# Patient Record
Sex: FEMALE | URBAN - NONMETROPOLITAN AREA
[De-identification: names, ages, dates, MRNs, and addresses within clinical notes are randomized per-mention and may not be internally consistent; named-entity substitution may affect disease eponyms.]

---

## 2018-09-06 ENCOUNTER — OPTICAL OFFICE (OUTPATIENT)
Dept: URBAN - NONMETROPOLITAN AREA CLINIC 4 | Facility: CLINIC | Age: 48
Setting detail: OPHTHALMOLOGY
End: 2018-09-06
Payer: COMMERCIAL

## 2018-09-06 ENCOUNTER — DOCTOR'S OFFICE (OUTPATIENT)
Dept: URBAN - NONMETROPOLITAN AREA CLINIC 1 | Facility: CLINIC | Age: 48
Setting detail: OPHTHALMOLOGY
End: 2018-09-06
Payer: COMMERCIAL

## 2018-09-06 DIAGNOSIS — H52.4: ICD-10-CM

## 2018-09-06 DIAGNOSIS — H52.13: ICD-10-CM

## 2018-09-06 DIAGNOSIS — H40.003: ICD-10-CM

## 2018-09-06 PROCEDURE — 92014 COMPRE OPH EXAM EST PT 1/>: CPT | Performed by: OPTOMETRIST

## 2018-09-06 PROCEDURE — V2020 VISION SVCS FRAMES PURCHASES: HCPCS | Performed by: OPHTHALMOLOGY

## 2018-09-06 PROCEDURE — V2784 LENS POLYCARB OR EQUAL: HCPCS | Performed by: OPHTHALMOLOGY

## 2018-09-06 PROCEDURE — 92015 DETERMINE REFRACTIVE STATE: CPT | Performed by: OPTOMETRIST

## 2018-09-06 PROCEDURE — V2200 LENS SPHER BIFOC PLANO 4.00D: HCPCS | Performed by: OPHTHALMOLOGY

## 2018-09-06 ASSESSMENT — REFRACTION_MANIFEST
OD_ADD: +1.75
OS_CYLINDER: SPH
OS_ADD: +1.75
OS_VA2: 20/
OS_ADD: +1.50
OS_VA2: 20/20-1
OD_VA3: 20/
OD_VA2: 20/25+2
OD_ADD: +1.50
OD_VA1: 20/20
OD_VA3: 20/
OS_SPHERE: -3.25
OD_VA2: 20/
OS_VA3: 20/
OS_VA1: 20/20
OD_VA1: 20/25+2
OD_CYLINDER: SPH
OS_VA1: 20/20-2
OS_VA3: 20/
OS_SPHERE: -3.25
OD_SPHERE: -3.25
OD_SPHERE: -3.25
OU_VA: 20/
OU_VA: 20/

## 2018-09-06 ASSESSMENT — VISUAL ACUITY
OS_BCVA: 20/150
OD_BCVA: 20/70-1

## 2018-09-06 ASSESSMENT — REFRACTION_CURRENTRX
OS_OVR_VA: 20/
OD_OVR_VA: 20/
OS_AXIS: 180
OS_VPRISM_DIRECTION: SV
OD_OVR_VA: 20/
OD_SPHERE: -3.75
OD_AXIS: 180
OD_OVR_VA: 20/
OS_CYLINDER: 0.00
OS_OVR_VA: 20/
OS_OVR_VA: 20/
OD_VPRISM_DIRECTION: SV
OD_CYLINDER: 0.00
OS_SPHERE: -3.25

## 2018-09-06 ASSESSMENT — REFRACTION_AUTOREFRACTION
OS_CYLINDER: -0.50
OD_SPHERE: -2.75
OD_CYLINDER: -0.50
OS_AXIS: 149
OD_AXIS: 021
OS_SPHERE: -2.25

## 2018-09-06 ASSESSMENT — SPHEQUIV_DERIVED
OS_SPHEQUIV: -2.5
OD_SPHEQUIV: -3

## 2018-09-06 ASSESSMENT — CONFRONTATIONAL VISUAL FIELD TEST (CVF)
OS_FINDINGS: FULL
OD_FINDINGS: FULL

## 2019-07-31 ENCOUNTER — OPTICAL OFFICE (OUTPATIENT)
Dept: URBAN - NONMETROPOLITAN AREA CLINIC 4 | Facility: CLINIC | Age: 49
Setting detail: OPHTHALMOLOGY
End: 2019-07-31
Payer: COMMERCIAL

## 2019-07-31 DIAGNOSIS — H52.13: ICD-10-CM

## 2019-07-31 PROCEDURE — V2020 VISION SVCS FRAMES PURCHASES: HCPCS | Performed by: OPTOMETRIST

## 2019-07-31 PROCEDURE — V2784 LENS POLYCARB OR EQUAL: HCPCS | Performed by: OPTOMETRIST

## 2019-07-31 PROCEDURE — V2200 LENS SPHER BIFOC PLANO 4.00D: HCPCS | Performed by: OPTOMETRIST

## 2019-07-31 PROCEDURE — V2744 TINT PHOTOCHROMATIC LENS/ES: HCPCS | Performed by: OPTOMETRIST

## 2019-09-13 ENCOUNTER — DOCTOR'S OFFICE (OUTPATIENT)
Dept: URBAN - NONMETROPOLITAN AREA CLINIC 1 | Facility: CLINIC | Age: 49
Setting detail: OPHTHALMOLOGY
End: 2019-09-13
Payer: COMMERCIAL

## 2019-09-13 ENCOUNTER — OPTICAL OFFICE (OUTPATIENT)
Dept: URBAN - NONMETROPOLITAN AREA CLINIC 4 | Facility: CLINIC | Age: 49
Setting detail: OPHTHALMOLOGY
End: 2019-09-13
Payer: COMMERCIAL

## 2019-09-13 DIAGNOSIS — H40.003: ICD-10-CM

## 2019-09-13 DIAGNOSIS — H52.13: ICD-10-CM

## 2019-09-13 PROCEDURE — V2750 ANTI-REFLECTIVE COATING: HCPCS | Performed by: OPTOMETRIST

## 2019-09-13 PROCEDURE — V2203 LENS SPHCYL BIFOCAL 4.00D/.1: HCPCS | Performed by: OPTOMETRIST

## 2019-09-13 PROCEDURE — V2202 LENS SPHERE BIFOCAL 7.12-20.: HCPCS | Performed by: OPTOMETRIST

## 2019-09-13 PROCEDURE — V2784 LENS POLYCARB OR EQUAL: HCPCS | Performed by: OPTOMETRIST

## 2019-09-13 PROCEDURE — 92014 COMPRE OPH EXAM EST PT 1/>: CPT | Performed by: OPTOMETRIST

## 2019-09-13 PROCEDURE — V2020 VISION SVCS FRAMES PURCHASES: HCPCS | Performed by: OPTOMETRIST

## 2019-09-13 ASSESSMENT — VISUAL ACUITY
OD_BCVA: 20/25
OS_BCVA: 20/20-2

## 2019-09-13 ASSESSMENT — REFRACTION_MANIFEST
OS_CYLINDER: SPH
OS_VA2: 20/20-1
OS_ADD: +1.50
OD_VA2: 20/
OD_VA1: 20/20
OD_SPHERE: -3.25
OS_SPHERE: -3.25
OD_ADD: +1.75
OD_VA1: 20/25+2
OD_SPHERE: -3.25
OS_ADD: +1.75
OS_VA3: 20/
OD_CYLINDER: SPH
OD_SPHERE: -3.25
OS_AXIS: 90
OD_ADD: +1.50
OD_VA3: 20/
OD_VA3: 20/
OU_VA: 20/
OD_VA2: 20/
OS_VA1: 20/20
OD_ADD: +1.75
OS_VA2: 20/
OS_CYLINDER: -0.25
OS_VA1: 20/20-2
OS_VA3: 20/
OS_ADD: +1.75
OD_VA1: 20/20
OU_VA: 20/
OS_SPHERE: -3.25
OS_VA1: 20/20
OD_VA2: 20/25+2
OD_VA3: 20/
OS_SPHERE: -3.25
OS_VA3: 20/
OS_VA2: 20/
OD_CYLINDER: SPH

## 2019-09-13 ASSESSMENT — REFRACTION_CURRENTRX
OD_ADD: +2.00
OD_OVR_VA: 20/
OS_OVR_VA: 20/
OD_OVR_VA: 20/
OD_OVR_VA: 20/
OS_AXIS: 174
OD_AXIS: 52
OS_OVR_VA: 20/
OS_ADD: +2.00
OS_VPRISM_DIRECTION: BF
OS_OVR_VA: 20/
OS_CYLINDER: -0.25
OD_VPRISM_DIRECTION: BF
OS_SPHERE: -3.25
OD_CYLINDER: -0.25
OD_SPHERE: -3.25

## 2019-09-13 ASSESSMENT — SPHEQUIV_DERIVED
OD_SPHEQUIV: -3
OS_SPHEQUIV: -3.375
OS_SPHEQUIV: -2.5

## 2019-09-13 ASSESSMENT — REFRACTION_AUTOREFRACTION
OS_CYLINDER: -0.50
OS_SPHERE: -2.25
OD_CYLINDER: -0.50
OD_AXIS: 021
OS_AXIS: 149
OD_SPHERE: -2.75

## 2019-09-13 ASSESSMENT — CONFRONTATIONAL VISUAL FIELD TEST (CVF)
OD_FINDINGS: FULL
OS_FINDINGS: FULL

## 2020-09-15 ENCOUNTER — OPTICAL OFFICE (OUTPATIENT)
Dept: URBAN - NONMETROPOLITAN AREA CLINIC 4 | Facility: CLINIC | Age: 50
Setting detail: OPHTHALMOLOGY
End: 2020-09-15
Payer: COMMERCIAL

## 2020-09-15 ENCOUNTER — DOCTOR'S OFFICE (OUTPATIENT)
Dept: URBAN - NONMETROPOLITAN AREA CLINIC 1 | Facility: CLINIC | Age: 50
Setting detail: OPHTHALMOLOGY
End: 2020-09-15
Payer: COMMERCIAL

## 2020-09-15 DIAGNOSIS — H52.4: ICD-10-CM

## 2020-09-15 DIAGNOSIS — H52.13: ICD-10-CM

## 2020-09-15 DIAGNOSIS — H40.003: ICD-10-CM

## 2020-09-15 PROCEDURE — V2020 VISION SVCS FRAMES PURCHASES: HCPCS | Performed by: OPTOMETRIST

## 2020-09-15 PROCEDURE — 92015 DETERMINE REFRACTIVE STATE: CPT | Performed by: OPTOMETRIST

## 2020-09-15 PROCEDURE — 92014 COMPRE OPH EXAM EST PT 1/>: CPT | Performed by: OPTOMETRIST

## 2020-09-15 PROCEDURE — V2203 LENS SPHCYL BIFOCAL 4.00D/.1: HCPCS | Performed by: OPTOMETRIST

## 2020-09-15 PROCEDURE — V2744 TINT PHOTOCHROMATIC LENS/ES: HCPCS | Performed by: OPTOMETRIST

## 2020-09-15 PROCEDURE — V2202 LENS SPHERE BIFOCAL 7.12-20.: HCPCS | Performed by: OPTOMETRIST

## 2020-09-15 PROCEDURE — 92083 EXTENDED VISUAL FIELD XM: CPT | Performed by: OPTOMETRIST

## 2020-09-15 PROCEDURE — V2784 LENS POLYCARB OR EQUAL: HCPCS | Performed by: OPTOMETRIST

## 2020-09-15 ASSESSMENT — REFRACTION_CURRENTRX
OS_ADD: +2.00
OS_VPRISM_DIRECTION: BF
OD_VPRISM_DIRECTION: BF
OD_AXIS: 180
OS_SPHERE: -3.50
OD_OVR_VA: 20/
OD_ADD: +2.00
OS_CYLINDER: 0.00
OS_OVR_VA: 20/
OS_AXIS: 174
OD_CYLINDER: 0.00
OD_SPHERE: -3.25

## 2020-09-15 ASSESSMENT — REFRACTION_AUTOREFRACTION
OS_AXIS: 172
OS_CYLINDER: -0.25
OD_CYLINDER: -0.50
OS_SPHERE: -3.00
OD_AXIS: 006

## 2020-09-15 ASSESSMENT — REFRACTION_MANIFEST
OS_SPHERE: -3.25
OS_ADD: +2.25
OD_SPHERE: -3.25
OD_VA2: 20/25+2
OS_AXIS: 90
OS_VA1: 20/20
OD_VA1: 20/25+2
OS_AXIS: 175
OD_VA1: 20/20
OS_SPHERE: -3.25
OD_SPHERE: -3.00
OD_SPHERE: -3.25
OS_VA2: 20/20-1
OD_ADD: +2.25
OD_CYLINDER: SPH
OS_ADD: +1.75
OD_ADD: +1.75
OS_CYLINDER: -0.25
OD_VA1: 20/20
OS_VA1: 20/20
OD_ADD: +1.75
OD_CYLINDER: SPH
OS_ADD: +1.75
OS_SPHERE: -3.25
OS_CYLINDER: -0.25
OS_VA1: 20/20-2

## 2020-09-15 ASSESSMENT — VISUAL ACUITY
OD_BCVA: 20/25
OS_BCVA: 20/20-1

## 2020-09-15 ASSESSMENT — CONFRONTATIONAL VISUAL FIELD TEST (CVF)
OS_FINDINGS: FULL
OD_FINDINGS: FULL

## 2020-09-15 ASSESSMENT — SPHEQUIV_DERIVED
OS_SPHEQUIV: -3.125
OS_SPHEQUIV: -3.375
OS_SPHEQUIV: -3.375

## 2020-10-02 ENCOUNTER — OPTICAL OFFICE (OUTPATIENT)
Dept: URBAN - NONMETROPOLITAN AREA CLINIC 4 | Facility: CLINIC | Age: 50
Setting detail: OPHTHALMOLOGY
End: 2020-10-02
Payer: COMMERCIAL

## 2020-10-02 DIAGNOSIS — H52.13: ICD-10-CM

## 2020-10-02 PROCEDURE — V2784 LENS POLYCARB OR EQUAL: HCPCS | Performed by: OPTOMETRIST

## 2020-10-02 PROCEDURE — V2203 LENS SPHCYL BIFOCAL 4.00D/.1: HCPCS | Performed by: OPTOMETRIST

## 2020-10-02 PROCEDURE — V2202 LENS SPHERE BIFOCAL 7.12-20.: HCPCS | Performed by: OPTOMETRIST

## 2020-10-02 PROCEDURE — V2020 VISION SVCS FRAMES PURCHASES: HCPCS | Performed by: OPTOMETRIST

## 2021-01-15 ENCOUNTER — IMMUNIZATIONS (OUTPATIENT)
Dept: FAMILY MEDICINE CLINIC | Facility: HOSPITAL | Age: 51
End: 2021-01-15

## 2021-01-15 DIAGNOSIS — Z23 ENCOUNTER FOR IMMUNIZATION: Primary | ICD-10-CM

## 2021-01-15 PROCEDURE — 91301 SARS-COV-2 / COVID-19 MRNA VACCINE (MODERNA) 100 MCG: CPT

## 2021-01-15 PROCEDURE — 0011A SARS-COV-2 / COVID-19 MRNA VACCINE (MODERNA) 100 MCG: CPT

## 2021-02-12 ENCOUNTER — IMMUNIZATIONS (OUTPATIENT)
Dept: FAMILY MEDICINE CLINIC | Facility: HOSPITAL | Age: 51
End: 2021-02-12

## 2021-02-12 DIAGNOSIS — Z23 ENCOUNTER FOR IMMUNIZATION: Primary | ICD-10-CM

## 2021-02-12 PROCEDURE — 91301 SARS-COV-2 / COVID-19 MRNA VACCINE (MODERNA) 100 MCG: CPT | Performed by: EMERGENCY MEDICINE

## 2021-02-12 PROCEDURE — 0012A SARS-COV-2 / COVID-19 MRNA VACCINE (MODERNA) 100 MCG: CPT | Performed by: EMERGENCY MEDICINE

## 2021-04-18 ENCOUNTER — APPOINTMENT (EMERGENCY)
Dept: CT IMAGING | Facility: HOSPITAL | Age: 51
End: 2021-04-18
Payer: COMMERCIAL

## 2021-04-18 ENCOUNTER — HOSPITAL ENCOUNTER (EMERGENCY)
Facility: HOSPITAL | Age: 51
Discharge: HOME | End: 2021-04-18
Attending: EMERGENCY MEDICINE
Payer: COMMERCIAL

## 2021-04-18 ENCOUNTER — APPOINTMENT (EMERGENCY)
Dept: RADIOLOGY | Facility: HOSPITAL | Age: 51
End: 2021-04-18
Payer: COMMERCIAL

## 2021-04-18 VITALS
HEART RATE: 68 BPM | SYSTOLIC BLOOD PRESSURE: 111 MMHG | WEIGHT: 274.03 LBS | DIASTOLIC BLOOD PRESSURE: 67 MMHG | RESPIRATION RATE: 27 BRPM | TEMPERATURE: 99.3 F | OXYGEN SATURATION: 98 %

## 2021-04-18 DIAGNOSIS — J18.9 PNEUMONIA: ICD-10-CM

## 2021-04-18 DIAGNOSIS — F41.9 ANXIETY: Primary | ICD-10-CM

## 2021-04-18 LAB
ALBUMIN SERPL BCP-MCNC: 3.4 G/DL (ref 3.5–5)
ALP SERPL-CCNC: 96 U/L (ref 46–116)
ALT SERPL W P-5'-P-CCNC: 18 U/L (ref 12–78)
ANION GAP SERPL CALCULATED.3IONS-SCNC: 5 MMOL/L (ref 4–13)
AST SERPL W P-5'-P-CCNC: 16 U/L (ref 5–45)
BACTERIA UR QL AUTO: ABNORMAL /HPF
BASOPHILS # BLD MANUAL: 0 THOUSAND/UL (ref 0–0.1)
BASOPHILS NFR MAR MANUAL: 0 % (ref 0–1)
BILIRUB SERPL-MCNC: 0.56 MG/DL (ref 0.2–1)
BILIRUB UR QL STRIP: NEGATIVE
BUN SERPL-MCNC: 20 MG/DL (ref 5–25)
CALCIUM ALBUM COR SERPL-MCNC: 9.2 MG/DL (ref 8.3–10.1)
CALCIUM SERPL-MCNC: 8.7 MG/DL (ref 8.3–10.1)
CHLORIDE SERPL-SCNC: 102 MMOL/L (ref 100–108)
CLARITY UR: CLEAR
CO2 SERPL-SCNC: 29 MMOL/L (ref 21–32)
COLOR UR: YELLOW
CREAT SERPL-MCNC: 0.99 MG/DL (ref 0.6–1.3)
EOSINOPHIL # BLD MANUAL: 0.2 THOUSAND/UL (ref 0–0.4)
EOSINOPHIL NFR BLD MANUAL: 1 % (ref 0–6)
ERYTHROCYTE [DISTWIDTH] IN BLOOD BY AUTOMATED COUNT: 13.6 % (ref 11.6–15.1)
FLUAV RNA RESP QL NAA+PROBE: NEGATIVE
FLUBV RNA RESP QL NAA+PROBE: NEGATIVE
GFR SERPL CREATININE-BSD FRML MDRD: 67 ML/MIN/1.73SQ M
GLUCOSE SERPL-MCNC: 105 MG/DL (ref 65–140)
GLUCOSE UR STRIP-MCNC: NEGATIVE MG/DL
HCT VFR BLD AUTO: 44.4 % (ref 34.8–46.1)
HGB BLD-MCNC: 14.3 G/DL (ref 11.5–15.4)
HGB UR QL STRIP.AUTO: ABNORMAL
KETONES UR STRIP-MCNC: NEGATIVE MG/DL
LACTATE SERPL-SCNC: 0.8 MMOL/L (ref 0.5–2)
LEUKOCYTE ESTERASE UR QL STRIP: NEGATIVE
LYMPHOCYTES # BLD AUTO: 0.59 THOUSAND/UL (ref 0.6–4.47)
LYMPHOCYTES # BLD AUTO: 3 % (ref 14–44)
MCH RBC QN AUTO: 28.5 PG (ref 26.8–34.3)
MCHC RBC AUTO-ENTMCNC: 32.2 G/DL (ref 31.4–37.4)
MCV RBC AUTO: 88 FL (ref 82–98)
MONOCYTES # BLD AUTO: 1.18 THOUSAND/UL (ref 0–1.22)
MONOCYTES NFR BLD: 6 % (ref 4–12)
MUCOUS THREADS UR QL AUTO: ABNORMAL
NEUTROPHILS # BLD MANUAL: 17.69 THOUSAND/UL (ref 1.85–7.62)
NEUTS BAND NFR BLD MANUAL: 3 % (ref 0–8)
NEUTS SEG NFR BLD AUTO: 87 % (ref 43–75)
NITRITE UR QL STRIP: NEGATIVE
NON-SQ EPI CELLS URNS QL MICRO: ABNORMAL /HPF
NRBC BLD AUTO-RTO: 0 /100 WBCS
PH UR STRIP.AUTO: 5.5 [PH]
PLATELET # BLD AUTO: 326 THOUSANDS/UL (ref 149–390)
PLATELET BLD QL SMEAR: ADEQUATE
PMV BLD AUTO: 10.5 FL (ref 8.9–12.7)
POTASSIUM SERPL-SCNC: 4.1 MMOL/L (ref 3.5–5.3)
PROT SERPL-MCNC: 7.5 G/DL (ref 6.4–8.2)
PROT UR STRIP-MCNC: NEGATIVE MG/DL
RBC # BLD AUTO: 5.02 MILLION/UL (ref 3.81–5.12)
RBC #/AREA URNS AUTO: ABNORMAL /HPF
RBC MORPH BLD: NORMAL
RSV RNA RESP QL NAA+PROBE: NEGATIVE
SARS-COV-2 RNA RESP QL NAA+PROBE: NEGATIVE
SODIUM SERPL-SCNC: 136 MMOL/L (ref 136–145)
SP GR UR STRIP.AUTO: 1.02 (ref 1–1.03)
TOTAL CELLS COUNTED SPEC: 100
TROPONIN I SERPL-MCNC: <0.02 NG/ML
UROBILINOGEN UR QL STRIP.AUTO: 0.2 E.U./DL
WBC # BLD AUTO: 19.66 THOUSAND/UL (ref 4.31–10.16)
WBC #/AREA URNS AUTO: ABNORMAL /HPF

## 2021-04-18 PROCEDURE — 96374 THER/PROPH/DIAG INJ IV PUSH: CPT

## 2021-04-18 PROCEDURE — 81001 URINALYSIS AUTO W/SCOPE: CPT | Performed by: PHYSICIAN ASSISTANT

## 2021-04-18 PROCEDURE — 74177 CT ABD & PELVIS W/CONTRAST: CPT

## 2021-04-18 PROCEDURE — 84484 ASSAY OF TROPONIN QUANT: CPT | Performed by: PHYSICIAN ASSISTANT

## 2021-04-18 PROCEDURE — 71045 X-RAY EXAM CHEST 1 VIEW: CPT

## 2021-04-18 PROCEDURE — 93005 ELECTROCARDIOGRAM TRACING: CPT

## 2021-04-18 PROCEDURE — 85027 COMPLETE CBC AUTOMATED: CPT | Performed by: PHYSICIAN ASSISTANT

## 2021-04-18 PROCEDURE — 99285 EMERGENCY DEPT VISIT HI MDM: CPT | Performed by: EMERGENCY MEDICINE

## 2021-04-18 PROCEDURE — 83605 ASSAY OF LACTIC ACID: CPT | Performed by: PHYSICIAN ASSISTANT

## 2021-04-18 PROCEDURE — 36415 COLL VENOUS BLD VENIPUNCTURE: CPT | Performed by: PHYSICIAN ASSISTANT

## 2021-04-18 PROCEDURE — 85007 BL SMEAR W/DIFF WBC COUNT: CPT | Performed by: PHYSICIAN ASSISTANT

## 2021-04-18 PROCEDURE — 96375 TX/PRO/DX INJ NEW DRUG ADDON: CPT

## 2021-04-18 PROCEDURE — 80053 COMPREHEN METABOLIC PANEL: CPT | Performed by: PHYSICIAN ASSISTANT

## 2021-04-18 PROCEDURE — 99284 EMERGENCY DEPT VISIT MOD MDM: CPT

## 2021-04-18 PROCEDURE — 0241U HB NFCT DS VIR RESP RNA 4 TRGT: CPT | Performed by: PHYSICIAN ASSISTANT

## 2021-04-18 RX ORDER — AZITHROMYCIN 250 MG/1
500 TABLET, FILM COATED ORAL ONCE
Status: DISCONTINUED | OUTPATIENT
Start: 2021-04-18 | End: 2021-04-18

## 2021-04-18 RX ORDER — AZITHROMYCIN 250 MG/1
TABLET, FILM COATED ORAL
Qty: 6 TABLET | Refills: 0 | Status: SHIPPED | OUTPATIENT
Start: 2021-04-18 | End: 2021-04-18

## 2021-04-18 RX ORDER — DOXYCYCLINE HYCLATE 100 MG/1
100 CAPSULE ORAL 2 TIMES DAILY
Qty: 20 CAPSULE | Refills: 0 | Status: SHIPPED | OUTPATIENT
Start: 2021-04-18 | End: 2021-04-28

## 2021-04-18 RX ORDER — ACETAMINOPHEN 500 MG
TABLET ORAL
COMMUNITY

## 2021-04-18 RX ORDER — PAROXETINE HYDROCHLORIDE 20 MG/1
20 TABLET, FILM COATED ORAL
COMMUNITY
Start: 2021-03-18

## 2021-04-18 RX ORDER — DOXYCYCLINE HYCLATE 100 MG/1
100 CAPSULE ORAL ONCE
Status: COMPLETED | OUTPATIENT
Start: 2021-04-18 | End: 2021-04-18

## 2021-04-18 RX ORDER — ALBUTEROL SULFATE 90 UG/1
2 AEROSOL, METERED RESPIRATORY (INHALATION)
COMMUNITY
Start: 2021-03-18

## 2021-04-18 RX ORDER — ONDANSETRON 2 MG/ML
4 INJECTION INTRAMUSCULAR; INTRAVENOUS ONCE
Status: COMPLETED | OUTPATIENT
Start: 2021-04-18 | End: 2021-04-18

## 2021-04-18 RX ORDER — LORAZEPAM 2 MG/ML
1 INJECTION INTRAMUSCULAR ONCE
Status: COMPLETED | OUTPATIENT
Start: 2021-04-18 | End: 2021-04-18

## 2021-04-18 RX ORDER — METOPROLOL TARTRATE 50 MG/1
50 TABLET, FILM COATED ORAL
COMMUNITY
Start: 2021-03-18

## 2021-04-18 RX ADMIN — ONDANSETRON 4 MG: 2 INJECTION INTRAMUSCULAR; INTRAVENOUS at 13:43

## 2021-04-18 RX ADMIN — LORAZEPAM 1 MG: 2 INJECTION INTRAMUSCULAR; INTRAVENOUS at 13:43

## 2021-04-18 RX ADMIN — IOHEXOL 100 ML: 350 INJECTION, SOLUTION INTRAVENOUS at 17:16

## 2021-04-18 RX ADMIN — DOXYCYCLINE 100 MG: 100 CAPSULE ORAL at 18:14

## 2021-04-18 NOTE — DISCHARGE INSTRUCTIONS
If you have any new or worsening symptoms please return immediately to the emergency department  Please take antibiotics as prescribed  Please follow-up with your family doctor

## 2021-04-18 NOTE — ED ATTENDING ATTESTATION
4/18/2021  IEber MD, saw and evaluated the patient  I have discussed the patient with the resident/non-physician practitioner and agree with the resident's/non-physician practitioner's findings, Plan of Care, and MDM as documented in the resident's/non-physician practitioner's note, except where noted  All available labs and Radiology studies were reviewed  I was present for key portions of any procedure(s) performed by the resident/non-physician practitioner and I was immediately available to provide assistance  At this point I agree with the current assessment done in the Emergency Department      ED Course         Critical Care Time  Procedures

## 2021-04-18 NOTE — ED PROVIDER NOTES
History  Chief Complaint   Patient presents with    Anxiety     this morning had episode of coughing and vomiting, states this triggered her anxiety, states feeling extremely anxious since, continues with nausea at times, states also has back pain since episode    Back Pain     26-year-old female presents the emergency department for evaluation  She reports this morning she had woken up and had episode of coughing  Reports she felt as she was coughing so hard she almost vomited  Patient reports this triggered an anxiety attack  Reports she became extremely anxious and had to lay down  She reports history of panic attacks and states this feels similar  Patient denies any shortness of breath or chest pain  Reports she did have episode of back pain however this has resolved  Patient denies any injury trauma  She denies headaches, dizziness, confusion  She denies any dysuria, hematuria, urinary frequency  Patient denies any fevers or chills  Patient reports she has significant amount of family stressors as 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, 3year-old and her son is on a honeymoon  Reports she did take Paxil this morning without improvement of symptoms  History provided by:  Patient  Anxiety  Presenting symptoms: no aggressive behavior, no agitation, no bizarre behavior, no delusions, no depression, no disorganized speech, no disorganized thought process, no hallucinations, no homicidal ideas, no paranoid behavior, no self-mutilation, no suicidal thoughts, no suicidal threats and no suicide attempt    Degree of incapacity (severity):  Mild  Onset quality:  Sudden  Timing:  Constant  Progression:  Improving  Chronicity:  Recurrent  Treatment compliance:   All of the time  Relieved by:  Nothing  Associated symptoms: anxiety    Associated symptoms: no abdominal pain, no anhedonia, no appetite change, no chest pain, no decreased need for sleep, not distractible, no euphoric mood, no fatigue, no feelings of worthlessness, no headaches, no hypersomnia, no hyperventilation, no insomnia, no irritability, no poor judgment and no weight change    Cough  Cough characteristics:  Non-productive  Onset quality:  Sudden  Chronicity:  New  Smoker: no    Ineffective treatments:  None tried  Associated symptoms: no chest pain, no chills, no diaphoresis, no ear fullness, no ear pain, no eye discharge, no fever, no headaches, no myalgias, no rash, no rhinorrhea, no shortness of breath, no sinus congestion, no sore throat, no weight loss and no wheezing        Prior to Admission Medications   Prescriptions Last Dose Informant Patient Reported? Taking? Cyanocobalamin 1000 MCG/ML KIT   Yes No   Sig: Inject 1,000 mcg into a muscle   PARoxetine (PAXIL) 20 mg tablet   Yes Yes   Sig: Take 20 mg by mouth   acetaminophen (TYLENOL) 500 mg tablet   Yes No   Sig: Take by mouth   albuterol (PROVENTIL HFA,VENTOLIN HFA) 90 mcg/act inhaler   Yes Yes   Sig: Inhale 2 puffs   metoprolol tartrate (LOPRESSOR) 50 mg tablet   Yes Yes   Sig: Take 50 mg by mouth      Facility-Administered Medications: None       Past Medical History:   Diagnosis Date    Anxiety     DVT (deep venous thrombosis) (HCC)     Hypertension     OCD (obsessive compulsive disorder)        Past Surgical History:   Procedure Laterality Date    CHOLECYSTECTOMY      GASTRIC BYPASS      HYSTERECTOMY      KNEE ARTHROSCOPY         History reviewed  No pertinent family history  I have reviewed and agree with the history as documented  E-Cigarette/Vaping    E-Cigarette Use Never User      E-Cigarette/Vaping Substances     Social History     Tobacco Use    Smoking status: Never Smoker    Smokeless tobacco: Never Used   Substance Use Topics    Alcohol use: Never     Frequency: Never    Drug use: Never       Review of Systems   Constitutional: Negative for appetite change, chills, diaphoresis, fatigue, fever, irritability and weight loss  HENT: Negative  Negative for ear pain, rhinorrhea and sore throat  Eyes: Negative for discharge  Respiratory: Positive for cough  Negative for choking, chest tightness, shortness of breath, wheezing and stridor  Cardiovascular: Negative  Negative for chest pain, palpitations and leg swelling  Gastrointestinal: Positive for nausea  Negative for abdominal distention, abdominal pain, anal bleeding, blood in stool, constipation, diarrhea, rectal pain and vomiting  Genitourinary: Negative  Musculoskeletal: Negative  Negative for myalgias  Skin: Negative  Negative for rash  Neurological: Negative  Negative for headaches  Psychiatric/Behavioral: Negative for agitation, hallucinations, homicidal ideas, paranoia, self-injury and suicidal ideas  The patient is nervous/anxious  The patient does not have insomnia  All other systems reviewed and are negative  Physical Exam  Physical Exam  Vitals signs and nursing note reviewed  Constitutional:       General: She is not in acute distress  Appearance: She is normal weight  She is not ill-appearing, toxic-appearing or diaphoretic  HENT:      Head: Normocephalic and atraumatic  Nose: Nose normal  No congestion or rhinorrhea  Mouth/Throat:      Mouth: Mucous membranes are moist       Pharynx: Oropharynx is clear  No oropharyngeal exudate or posterior oropharyngeal erythema  Eyes:      Extraocular Movements: Extraocular movements intact  Conjunctiva/sclera: Conjunctivae normal       Pupils: Pupils are equal, round, and reactive to light  Neck:      Musculoskeletal: Normal range of motion and neck supple  No muscular tenderness  Cardiovascular:      Rate and Rhythm: Normal rate and regular rhythm  Heart sounds: No murmur  Pulmonary:      Effort: Pulmonary effort is normal  No respiratory distress  Breath sounds: No stridor  Examination of the left-middle field reveals rhonchi  Examination of the left-lower field reveals rhonchi  Decreased breath sounds and rhonchi present  No wheezing or rales  Chest:      Chest wall: No tenderness  Abdominal:      General: Abdomen is flat  Bowel sounds are normal  There is no distension  Palpations: Abdomen is soft  Tenderness: There is no abdominal tenderness  There is no right CVA tenderness, left CVA tenderness or guarding  Musculoskeletal: Normal range of motion  General: No swelling, tenderness or deformity  Right lower leg: No edema  Left lower leg: No edema  Skin:     General: Skin is warm and dry  Capillary Refill: Capillary refill takes less than 2 seconds  Coloration: Skin is not pale  Findings: No bruising, erythema or rash  Neurological:      General: No focal deficit present  Mental Status: She is alert and oriented to person, place, and time  GCS: GCS eye subscore is 4  GCS verbal subscore is 5  GCS motor subscore is 6  Cranial Nerves: Cranial nerves are intact  No dysarthria  Sensory: Sensation is intact  No sensory deficit  Motor: Motor function is intact  No weakness  Coordination: Coordination is intact  Coordination normal       Gait: Gait is intact  Gait normal       Deep Tendon Reflexes: Reflexes normal    Psychiatric:         Attention and Perception: Attention normal          Mood and Affect: Mood is anxious  Behavior: Behavior normal          Thought Content: Thought content normal  Thought content is not paranoid or delusional  Thought content does not include homicidal or suicidal ideation  Thought content does not include homicidal or suicidal plan           Cognition and Memory: Cognition normal          Judgment: Judgment normal       Comments: Rapid speech         Vital Signs  ED Triage Vitals [04/18/21 1233]   Temperature Pulse Respirations Blood Pressure SpO2   99 3 °F (37 4 °C) 89 20 138/72 95 %      Temp Source Heart Rate Source Patient Position - Orthostatic VS BP Location FiO2 (%) Temporal Monitor Lying Right arm --      Pain Score       --           Vitals:    04/18/21 1700 04/18/21 1715 04/18/21 1730 04/18/21 1800   BP: 112/56 125/57 109/59 111/67   Pulse: 66 74 70 68   Patient Position - Orthostatic VS:             Visual Acuity      ED Medications  Medications   LORazepam (ATIVAN) injection 1 mg (1 mg Intravenous Given 4/18/21 1343)   ondansetron (ZOFRAN) injection 4 mg (4 mg Intravenous Given 4/18/21 1343)   iohexol (OMNIPAQUE) 350 MG/ML injection (SINGLE-DOSE) 100 mL (100 mL Intravenous Given 4/18/21 1716)   doxycycline hyclate (VIBRAMYCIN) capsule 100 mg (100 mg Oral Given 4/18/21 1814)       Diagnostic Studies  Results Reviewed     Procedure Component Value Units Date/Time    COVID19, Influenza A/B, RSV PCR, [facility] [[id number]]  (Normal) Collected: 04/18/21 1343    Lab Status: Final result Specimen: Nares from Nasopharyngeal Swab Updated: 04/18/21 1438     SARS-CoV-2 Negative     INFLUENZA A PCR Negative     INFLUENZA B PCR Negative     RSV PCR Negative    Narrative: This test has been authorized by FDA under an EUA (Emergency Use Assay) for use by authorized laboratories  Clinical caution and judgement should be used with the interpretation of these results with consideration of the clinical impression and other laboratory testing  Testing reported as "Positive" or "Negative" has been proven to be accurate according to standard laboratory validation requirements  All testing is performed with control materials showing appropriate reactivity at standard intervals  CBC and differential [[id number]]  (Abnormal) Collected: 04/18/21 1343    Lab Status: Final result Specimen: Blood from Arm, Right Updated: 04/18/21 1424     WBC 19 66 Thousand/uL      RBC 5 02 Million/uL      Hemoglobin 14 3 g/dL      Hematocrit 44 4 %      MCV 88 fL      MCH 28 5 pg      MCHC 32 2 g/dL      RDW 13 6 %      MPV 10 5 fL      Platelets 629 Thousands/uL      nRBC 0 /100 WBCs     Narrative:       This is an appended report  These results have been appended to a previously verified report  Manual Differential(PHLEBS Do Not Order) [[id number]]  (Abnormal) Collected: 04/18/21 1343    Lab Status: Final result Specimen: Blood from Arm, Right Updated: 04/18/21 1424     Segmented % 87 %      Bands % 3 %      Lymphocytes % 3 %      Monocytes % 6 %      Eosinophils, % 1 %      Basophils % 0 %      Absolute Neutrophils 17 69 Thousand/uL      Lymphocytes Absolute 0 59 Thousand/uL      Monocytes Absolute 1 18 Thousand/uL      Eosinophils Absolute 0 20 Thousand/uL      Basophils Absolute 0 00 Thousand/uL      Total Counted 100     RBC Morphology Normal     Platelet Estimate Adequate    Urine Microscopic [[id number]]  (Abnormal) Collected: 04/18/21 1348    Lab Status: Final result Specimen: Urine, Clean Catch Updated: 04/18/21 1418     RBC, UA 4-10 /hpf      WBC, UA None Seen /hpf      Epithelial Cells Occasional /hpf      Bacteria, UA None Seen /hpf      MUCUS THREADS Occasional    UA w Reflex to Microscopic w Reflex to Culture [[id number]]  (Abnormal) Collected: 04/18/21 1348    Lab Status: Final result Specimen: Urine, Clean Catch Updated: 04/18/21 1418     Color, UA Yellow     Clarity, UA Clear     Specific Gravity, UA 1 025     pH, UA 5 5     Leukocytes, UA Negative     Nitrite, UA Negative     Protein, UA Negative mg/dl      Glucose, UA Negative mg/dl      Ketones, UA Negative mg/dl      Urobilinogen, UA 0 2 E U /dl      Bilirubin, UA Negative     Blood, UA Small    Lactic acid [[id number]]  (Normal) Collected: 04/18/21 1343    Lab Status: Final result Specimen: Blood from Arm, Right Updated: 04/18/21 1417     LACTIC ACID 0 8 mmol/L     Narrative:      Result may be elevated if tourniquet was used during collection      Comprehensive metabolic panel [[id number]]  (Abnormal) Collected: 04/18/21 1343    Lab Status: Final result Specimen: Blood from Arm, Right Updated: 04/18/21 1412     Sodium 136 mmol/L      Potassium 4 1 mmol/L      Chloride 102 mmol/L      CO2 29 mmol/L      ANION GAP 5 mmol/L      BUN 20 mg/dL      Creatinine 0 99 mg/dL      Glucose 105 mg/dL      Calcium 8 7 mg/dL      Corrected Calcium 9 2 mg/dL      AST 16 U/L      ALT 18 U/L      Alkaline Phosphatase 96 U/L      Total Protein 7 5 g/dL      Albumin 3 4 g/dL      Total Bilirubin 0 56 mg/dL      eGFR 67 ml/min/1 73sq m     Narrative:      [place] guidelines for Chronic Kidney Disease (CKD):     Stage 1 with normal or high GFR (GFR > 90 mL/min/1 73 square meters)    Stage 2 Mild CKD (GFR = 60-89 mL/min/1 73 square meters)    Stage 3A Moderate CKD (GFR = 45-59 mL/min/1 73 square meters)    Stage 3B Moderate CKD (GFR = 30-44 mL/min/1 73 square meters)    Stage 4 Severe CKD (GFR = 15-29 mL/min/1 73 square meters)    Stage 5 End Stage CKD (GFR <15 mL/min/1 73 square meters)  Note: GFR calculation is accurate only with a steady state creatinine    Troponin I [[id number]]  (Normal) Collected: 04/18/21 1343    Lab Status: Final result Specimen: Blood from Arm, Right Updated: 04/18/21 1412     Troponin I <0 02 ng/mL                  CT abdomen pelvis with contrast   Final Result by María Alejandra MD (04/18 6858)   1  Patchy airspace opacities in the left lower lobe and lingula suspicious for pneumonia  2   Postsurgical changes of stomach  No evidence of bowel obstruction  3   Colonic diverticulosis without evidence of acute diverticulitis  Workstation performed: [id number]         XR chest 1 view portable   Final Result by Devin Boogie MD (04/18 9515)      Left infiltrates      The study was marked in EPIC for immediate notification  Workstation performed: [id number]                    Procedures  ECG 12 Lead Documentation Only    Date/Time: 4/18/2021 1:36 PM  Performed by: Ludy Stokes PA-C  Authorized by: Ludy Stokes PA-C     Indications / Diagnosis:   Anxiety  ECG reviewed by me, the ED Provider: yes Patient location:  ED  Interpretation:     Interpretation: non-specific    Rate:     ECG rate:  71    ECG rate assessment: normal    Rhythm:     Rhythm: sinus rhythm    Ectopy:     Ectopy: none    QRS:     QRS axis:  Normal    QRS intervals:  Normal  Conduction:     Conduction: normal    ST segments:     ST segments:  Normal  T waves:     T waves: normal               ED Course               SBIRT 22yo+      Most Recent Value   SBIRT (24 yo +)   In order to provide better care to our patients, we are screening all of our patients for alcohol and drug use  Would it be okay to ask you these screening questions? Yes Filed at: 04/18/2021 1241   Initial Alcohol Screen: US AUDIT-C    1  How often do you have a drink containing alcohol?  0 Filed at: 04/18/2021 1241   2  How many drinks containing alcohol do you have on a typical day you are drinking? 0 Filed at: 04/18/2021 1241   3a  Male UNDER 65: How often do you have five or more drinks on one occasion? 0 Filed at: 04/18/2021 1241   3b  FEMALE Any Age, or MALE 65+: How often do you have 4 or more drinks on one occassion? 0 Filed at: 04/18/2021 1241   Audit-C Score  0 Filed at: 04/18/2021 1241   YOHANNES: How many times in the past year have you    Used an illegal drug or used a prescription medication for non-medical reasons? Never Filed at: 04/18/2021 1241                    MDM  Number of Diagnoses or Management Options  Anxiety: new and requires workup  Pneumonia: new and requires workup  Diagnosis management comments: 71-year-old female presents to the emergency department feeling anxious status post episode of coughing this morning  Vitals and medical record reviewed  On exam patient is very anxious  Abdomen soft, nontender, nondistended  Heart regular and rhythm  Lungs are decreased with noted rhonchi on the left side  Laboratory findings were significant for leukocytosis  Chest x-ray concerning for left-sided infiltrates    UA negative for UTI however noted for hematuria discussed this with patient and she will have this recheck by PCP  CT abdomen negative for acute abdominal findings again noted concerning left-sided findings for pneumonia  COVID, flu, RSV negative  Troponin negative  EKG was nonischemic  Patient had resolution of symptoms with Ativan and Zofran in the emergency department  We thoroughly discussed results and findings  Patient started on doxycycline for a pneumonia  She will follow-up with PCP  Patient was educated on symptoms that require prompt return to the emergency department for further evaluation and verbalized understanding  Amount and/or Complexity of Data Reviewed  Clinical lab tests: ordered and reviewed  Tests in the radiology section of CPT®: ordered and reviewed  Review and summarize past medical records: yes  Independent visualization of images, tracings, or specimens: yes        Disposition  Final diagnoses:   Anxiety   Pneumonia     Time reflects when diagnosis was documented in both MDM as applicable and the Disposition within this note     Time User Action Codes Description Comment    4/18/2021  5:50 PM Yoni Javad [F41 9] Anxiety     4/18/2021  5:50 PM Rice Fillers Add [J18 9] Pneumonia       ED Disposition     ED Disposition Condition Date/Time Comment    Discharge Stable Sun Apr 18, 2021  5:50 PM Josee Amend discharge to home/self care              Follow-up Information     Follow up With Specialties Details Why Contact Info    Payette Files, DO Family Medicine In 1 week For continued care and evaluation [address] [place] [address]  [phone]            Discharge Medication List as of 4/18/2021  5:53 PM      START taking these medications    Details   azithromycin (ZITHROMAX) 250 mg tablet Take 2 tablets today then 1 tablet daily x 4 days, Normal         CONTINUE these medications which have NOT CHANGED    Details   albuterol (PROVENTIL HFA,VENTOLIN HFA) 90 mcg/act inhaler Inhale 2 puffs, Starting Thu 3/18/2021, Historical Med      metoprolol tartrate (LOPRESSOR) 50 mg tablet Take 50 mg by mouth, Starting Thu 3/18/2021, Historical Med      PARoxetine (PAXIL) 20 mg tablet Take 20 mg by mouth, Starting Thu 3/18/2021, Historical Med      acetaminophen (TYLENOL) 500 mg tablet Take by mouth, Historical Med      Cyanocobalamin 1000 MCG/ML KIT Inject 1,000 mcg into a muscle, Historical Med           No discharge procedures on file      PDMP Review     None          ED Provider  Electronically Signed by           Renetta Grigsby PA-C  04/18/21 [address] [place] [address] Juan Orozco MD  04/21/21 8005

## 2021-04-19 LAB
ATRIAL RATE: 71 BPM
P AXIS: 74 DEGREES
PR INTERVAL: 152 MS
QRS AXIS: 52 DEGREES
QRSD INTERVAL: 96 MS
QT INTERVAL: 388 MS
QTC INTERVAL: 421 MS
T WAVE AXIS: 51 DEGREES
VENTRICULAR RATE: 71 BPM

## 2021-07-10 ENCOUNTER — OFFICE VISIT (OUTPATIENT)
Dept: URGENT CARE | Facility: CLINIC | Age: 51
End: 2021-07-10
Payer: COMMERCIAL

## 2021-07-10 VITALS
TEMPERATURE: 98.4 F | HEART RATE: 57 BPM | SYSTOLIC BLOOD PRESSURE: 156 MMHG | HEIGHT: 68 IN | DIASTOLIC BLOOD PRESSURE: 69 MMHG | WEIGHT: 275 LBS | OXYGEN SATURATION: 96 % | RESPIRATION RATE: 20 BRPM | BODY MASS INDEX: 41.68 KG/M2

## 2021-07-10 DIAGNOSIS — S61.012A LACERATION OF LEFT THUMB WITHOUT FOREIGN BODY WITHOUT DAMAGE TO NAIL, INITIAL ENCOUNTER: Primary | ICD-10-CM

## 2021-07-10 PROCEDURE — 12001 RPR S/N/AX/GEN/TRNK 2.5CM/<: CPT | Performed by: NURSE PRACTITIONER

## 2021-07-10 PROCEDURE — 90715 TDAP VACCINE 7 YRS/> IM: CPT

## 2021-07-10 PROCEDURE — 90715 TDAP VACCINE 7 YRS/> IM: CPT | Performed by: NURSE PRACTITIONER

## 2021-07-10 PROCEDURE — 99213 OFFICE O/P EST LOW 20 MIN: CPT | Performed by: NURSE PRACTITIONER

## 2021-07-10 PROCEDURE — S9088 SERVICES PROVIDED IN URGENT: HCPCS | Performed by: NURSE PRACTITIONER

## 2021-07-10 RX ORDER — PEDI MULTIVIT NO.25/FOLIC ACID 300 MCG
1 TABLET,CHEWABLE ORAL DAILY
COMMUNITY

## 2021-07-10 RX ORDER — CALCIUM ACETATE 667 MG/1
667 CAPSULE ORAL
COMMUNITY

## 2021-07-10 NOTE — PATIENT INSTRUCTIONS
Keep the splint on the left thumb to protect from further injury  Keep the area clean and dry  Sutures placed in and needs to come out in 7-10 days  If signs or symptoms occur for infection be reseen or go to the ER  Follow up with pcp       Laceration   WHAT YOU NEED TO KNOW:   A laceration is an injury to the skin and the soft tissue underneath it  Lacerations can happen anywhere on the body  DISCHARGE INSTRUCTIONS:   Return to the emergency department if:   · You have heavy bleeding or bleeding that does not stop after 10 minutes of holding firm, direct pressure over the wound  · Your wound opens up  Call your doctor if:   · You have a fever or chills  · Your laceration is red, warm, or swollen  · You have red streaks on your skin coming from your wound  · You have white or yellow drainage from the wound that smells bad  · You have pain that gets worse, even after treatment  · You have questions or concerns about your condition or care  Medicines: You may need any of the following:  · Prescription pain medicine  may be given  Ask your healthcare provider how to take this medicine safely  Some prescription pain medicines contain acetaminophen  Do not take other medicines that contain acetaminophen without talking to your healthcare provider  Too much acetaminophen may cause liver damage  Prescription pain medicine may cause constipation  Ask your healthcare provider how to prevent or treat constipation  · Antibiotics  help treat or prevent a bacterial infection  · Take your medicine as directed  Contact your healthcare provider if you think your medicine is not helping or if you have side effects  Tell him or her if you are allergic to any medicine  Keep a list of the medicines, vitamins, and herbs you take  Include the amounts, and when and why you take them  Bring the list or the pill bottles to follow-up visits   Carry your medicine list with you in case of an emergency  Care for your wound as directed:   · Do not get your wound wet  until your healthcare provider says it is okay  Do not soak your wound in water  Do not go swimming until your healthcare provider says it is okay  Carefully wash the wound with soap and water  Gently pat the area dry or allow it to air dry  · Change your bandages  when they get wet, dirty, or after washing  Apply new, clean bandages as directed  Do not apply elastic bandages or tape too tight  Do not put powders or lotions over your incision  · Apply antibiotic ointment as directed  Your healthcare provider may give you antibiotic ointment to put over your wound if you have stitches  If you have strips of tape over your incision, let them dry up and fall off on their own  If they do not fall off within 14 days, gently remove them  If you have glue over your wound, do not remove or pick at it  If your glue comes off, do not replace it with glue that you have at home  · Check your wound every day for signs of infection, such as swelling, redness, or pus  Self-care:   · Apply ice  on your wound for 15 to 20 minutes every hour or as directed  Use an ice pack, or put crushed ice in a plastic bag  Cover it with a towel  Ice helps prevent tissue damage and decreases swelling and pain  · Use a splint as directed  A splint will decrease movement and stress on your wound  It may help it heal faster  A splint may be used for lacerations over joints or areas of your body that bend  Ask your healthcare provider how to apply and remove a splint  · Decrease scarring of your wound  by applying ointments as directed  Do not apply ointments until your healthcare provider says it is okay  You may need to wait until your wound is healed  Ask which ointment to buy and how often to use it  After your wound is healed, use sunscreen over the area when you are out in the sun   You should do this for at least 6 months to 1 year after your injury  Follow up with your doctor as directed: You may need to follow up in 24 to 48 hours to have your wound checked for infection  You will need to return in 3 to 14 days if you have stitches or staples so they can be removed  Care for your wound as directed to prevent infection and help it heal  Write down your questions so you remember to ask them during your visits  © Copyright [address] Information is for End User's use only and may not be sold, redistributed or otherwise used for commercial purposes  All illustrations and images included in CareNotes® are the copyrighted property of [employer]  or [address] [place], [place] [id number]   The above information is an  only  It is not intended as medical advice for individual conditions or treatments  Talk to your doctor, nurse or pharmacist before following any medical regimen to see if it is safe and effective for you

## 2021-07-10 NOTE — PROGRESS NOTES
NAME: Elroy Santos is a 49 y o  female  : 1970    MRN: [id number]    /69   Pulse 57   Temp 98 4 °F (36 9 °C)   Resp 20   Ht 5' 8" (1 727 m)   Wt 125 kg (275 lb)   SpO2 96%   BMI 41 81 kg/m²     Assessment and Plan   Laceration of left thumb without foreign body without damage to nail, initial encounter [S61 012A]  1  Laceration of left thumb without foreign body without damage to nail, initial encounter  Laceration repair    TDAP VACCINE GREATER THAN OR EQUAL TO 8YO IM    Splint       Darroll Bracket was seen today for finger laceration  Diagnoses and all orders for this visit:    Laceration of left thumb without foreign body without damage to nail, initial encounter  -     Laceration repair  -     TDAP VACCINE GREATER THAN OR EQUAL TO 8YO IM  -     Splint    finger splint placed on pts finger after suture was put in    Patient Instructions   Patient Instructions   Keep the splint on the left thumb to protect from further injury  Keep the area clean and dry  Sutures placed in and needs to come out in 7-10 days  If signs or symptoms occur for infection be reseen or go to the ER  Follow up with pcp       Laceration   WHAT YOU NEED TO KNOW:   A laceration is an injury to the skin and the soft tissue underneath it  Lacerations can happen anywhere on the body  DISCHARGE INSTRUCTIONS:   Return to the emergency department if:   · You have heavy bleeding or bleeding that does not stop after 10 minutes of holding firm, direct pressure over the wound  · Your wound opens up  Call your doctor if:   · You have a fever or chills  · Your laceration is red, warm, or swollen  · You have red streaks on your skin coming from your wound  · You have white or yellow drainage from the wound that smells bad  · You have pain that gets worse, even after treatment  · You have questions or concerns about your condition or care  Medicines:   You may need any of the following:  · Prescription pain medicine  may be given  Ask your healthcare provider how to take this medicine safely  Some prescription pain medicines contain acetaminophen  Do not take other medicines that contain acetaminophen without talking to your healthcare provider  Too much acetaminophen may cause liver damage  Prescription pain medicine may cause constipation  Ask your healthcare provider how to prevent or treat constipation  · Antibiotics  help treat or prevent a bacterial infection  · Take your medicine as directed  Contact your healthcare provider if you think your medicine is not helping or if you have side effects  Tell him or her if you are allergic to any medicine  Keep a list of the medicines, vitamins, and herbs you take  Include the amounts, and when and why you take them  Bring the list or the pill bottles to follow-up visits  Carry your medicine list with you in case of an emergency  Care for your wound as directed:   · Do not get your wound wet  until your healthcare provider says it is okay  Do not soak your wound in water  Do not go swimming until your healthcare provider says it is okay  Carefully wash the wound with soap and water  Gently pat the area dry or allow it to air dry  · Change your bandages  when they get wet, dirty, or after washing  Apply new, clean bandages as directed  Do not apply elastic bandages or tape too tight  Do not put powders or lotions over your incision  · Apply antibiotic ointment as directed  Your healthcare provider may give you antibiotic ointment to put over your wound if you have stitches  If you have strips of tape over your incision, let them dry up and fall off on their own  If they do not fall off within 14 days, gently remove them  If you have glue over your wound, do not remove or pick at it  If your glue comes off, do not replace it with glue that you have at home      · Check your wound every day for signs of infection, such as swelling, redness, or pus     Self-care:   · Apply ice  on your wound for 15 to 20 minutes every hour or as directed  Use an ice pack, or put crushed ice in a plastic bag  Cover it with a towel  Ice helps prevent tissue damage and decreases swelling and pain  · Use a splint as directed  A splint will decrease movement and stress on your wound  It may help it heal faster  A splint may be used for lacerations over joints or areas of your body that bend  Ask your healthcare provider how to apply and remove a splint  · Decrease scarring of your wound  by applying ointments as directed  Do not apply ointments until your healthcare provider says it is okay  You may need to wait until your wound is healed  Ask which ointment to buy and how often to use it  After your wound is healed, use sunscreen over the area when you are out in the sun  You should do this for at least 6 months to 1 year after your injury  Follow up with your doctor as directed: You may need to follow up in 24 to 48 hours to have your wound checked for infection  You will need to return in 3 to 14 days if you have stitches or staples so they can be removed  Care for your wound as directed to prevent infection and help it heal  Write down your questions so you remember to ask them during your visits  © Copyright [address] Information is for End User's use only and may not be sold, redistributed or otherwise used for commercial purposes  All illustrations and images included in CareNotes® are the copyrighted property of A D [employer] M [employer]  or [address] [place], [place] [address]   The above information is an  only  It is not intended as medical advice for individual conditions or treatments  Talk to your doctor, nurse or pharmacist before following any medical regimen to see if it is safe and effective for you  Proceed to the nearest ER if symptoms worsen, Follow up with your PCP  Continue to social distance, wash your hands, and wear your masks   Please continue to follow the CDC  gov guidelines daily for they are subject to change on COVID-19    Chief Complaint     Chief Complaint   Patient presents with    Finger Laceration     Left Thumb Lac         History of Present Illness     49 yr old woman here today with laceration to the left thumb that happened a few minutes ago  It is at the tip of the thumb and doesn't involve the nail  She has full ROM of the left thumb and has no FB present to the area  Pt was camping and was using a new knife set and cut self and hasnt had a tetanus in 5 years       Review of Systems   Review of Systems   Skin: Positive for wound (left thumb lac)           Current Medications       Current Outpatient Medications:     acetaminophen (TYLENOL) 500 mg tablet, Take by mouth, Disp: , Rfl:     albuterol (PROVENTIL HFA,VENTOLIN HFA) 90 mcg/act inhaler, Inhale 2 puffs, Disp: , Rfl:     calcium acetate (PHOSLO) capsule, Take 667 mg by mouth 3 (three) times a day with meals, Disp: , Rfl:     metoprolol tartrate (LOPRESSOR) 50 mg tablet, Take 50 mg by mouth, Disp: , Rfl:     PARoxetine (PAXIL) 20 mg tablet, Take 20 mg by mouth, Disp: , Rfl:     Pediatric Multiple Vit-C-FA (pediatric multivitamin) chewable tablet, Chew 1 tablet daily, Disp: , Rfl:     Cyanocobalamin 1000 MCG/ML KIT, Inject 1,000 mcg into a muscle (Patient not taking: Reported on 7/10/2021), Disp: , Rfl:     Current Allergies     Allergies as of 07/10/2021 - Reviewed 07/10/2021   Allergen Reaction Noted    Amoxicillin Anaphylaxis 04/18/2021    Azithromycin Other (See Comments) 10/05/2005    Codeine Chest Pain 04/18/2021    Metoclopramide Hallucinations 11/04/2010    Morphine Chest Pain 04/18/2021    Motrin [ibuprofen] Other (See Comments) 04/18/2021    Toradol [ketorolac tromethamine] Chest Pain 04/18/2021    Vancomycin Rash 04/18/2021              Past Medical History:   Diagnosis Date    Anxiety     DVT (deep venous thrombosis) (HCC)     Hypertension     OCD (obsessive compulsive disorder)        Past Surgical History:   Procedure Laterality Date    CHOLECYSTECTOMY      GASTRIC BYPASS      HYSTERECTOMY      KNEE ARTHROSCOPY         History reviewed  No pertinent family history  Medications have been verified  The following portions of the patient's history were reviewed and updated as appropriate: allergies, current medications, past family history, past medical history, past social history, past surgical history and problem list     Objective   /69   Pulse 57   Temp 98 4 °F (36 9 °C)   Resp 20   Ht 5' 8" (1 727 m)   Wt 125 kg (275 lb)   SpO2 96%   BMI 41 81 kg/m²        Physical Exam     Physical Exam  Constitutional:       Appearance: Normal appearance  Skin:     Findings: Laceration (tip of left thumb, nail intact) present  Neurological:      Mental Status: She is alert  Laceration repair    Date/Time: 7/10/2021 11:18 AM  Performed by: FAUSTINO Carter  Authorized by: FAUSTINO Carter   Body area: upper extremity  Location details: left thumb  Laceration length: 1 cm  Foreign bodies: no foreign bodies  Tendon involvement: none  Nerve involvement: none    Anesthesia:  Local Anesthetic: topical anesthetic (2% lidocaine jelly)  Anesthetic total: 2 mL    Sedation:  Patient sedated: no        Procedure Details:  Preparation: Patient was prepped and draped in the usual sterile fashion  Skin closure: 4-0 nylon  Number of sutures: 1  Technique: simple  Approximation: close  Approximation difficulty: simple  Dressing: 4x4 sterile gauze and splint  Patient tolerance: patient tolerated the procedure well with no immediate complications                Note: Portions of this record may have been created with voice recognition software  Occasional wrong word or "sound a like" substitutions may have occurred due to the inherent limitations of voice recognition software   Please read the chart carefully and recognize, using context, where substitutions have occurred  FAUSTINO Gilbert

## 2021-09-17 ENCOUNTER — DOCTOR'S OFFICE (OUTPATIENT)
Dept: URBAN - NONMETROPOLITAN AREA CLINIC 1 | Facility: CLINIC | Age: 51
Setting detail: OPHTHALMOLOGY
End: 2021-09-17
Payer: COMMERCIAL

## 2021-09-17 ENCOUNTER — OPTICAL OFFICE (OUTPATIENT)
Dept: URBAN - NONMETROPOLITAN AREA CLINIC 4 | Facility: CLINIC | Age: 51
Setting detail: OPHTHALMOLOGY
End: 2021-09-17
Payer: COMMERCIAL

## 2021-09-17 DIAGNOSIS — H52.223: ICD-10-CM

## 2021-09-17 DIAGNOSIS — H52.4: ICD-10-CM

## 2021-09-17 DIAGNOSIS — H25.013: ICD-10-CM

## 2021-09-17 DIAGNOSIS — H52.13: ICD-10-CM

## 2021-09-17 DIAGNOSIS — H40.013: ICD-10-CM

## 2021-09-17 PROCEDURE — V2203 LENS SPHCYL BIFOCAL 4.00D/.1: HCPCS | Performed by: OPTOMETRIST

## 2021-09-17 PROCEDURE — V2020 VISION SVCS FRAMES PURCHASES: HCPCS | Performed by: OPTOMETRIST

## 2021-09-17 PROCEDURE — 92015 DETERMINE REFRACTIVE STATE: CPT | Performed by: OPTOMETRIST

## 2021-09-17 PROCEDURE — V2744 TINT PHOTOCHROMATIC LENS/ES: HCPCS | Performed by: OPTOMETRIST

## 2021-09-17 PROCEDURE — V2784 LENS POLYCARB OR EQUAL: HCPCS | Performed by: OPTOMETRIST

## 2021-09-17 PROCEDURE — 92014 COMPRE OPH EXAM EST PT 1/>: CPT | Performed by: OPTOMETRIST

## 2021-09-17 PROCEDURE — V2202 LENS SPHERE BIFOCAL 7.12-20.: HCPCS | Performed by: OPTOMETRIST

## 2021-09-17 PROCEDURE — 92133 CPTRZD OPH DX IMG PST SGM ON: CPT | Performed by: OPTOMETRIST

## 2021-09-17 ASSESSMENT — REFRACTION_MANIFEST
OD_SPHERE: -3.25
OS_ADD: +1.75
OS_VA1: 20/20
OS_ADD: +2.25
OD_VA1: 20/20
OS_CYLINDER: -0.25
OS_VA1: 20/20
OD_SPHERE: -3.25
OD_CYLINDER: SPH
OS_AXIS: 90
OS_VA2: 20/20-1
OS_SPHERE: -3.25
OD_VA2: 20/25+2
OS_AXIS: 180
OS_ADD: +1.75
OD_VA1: 20/25+2
OS_CYLINDER: -0.25
OD_SPHERE: -3.25
OD_AXIS: 010
OD_ADD: +1.75
OS_SPHERE: -3.25
OD_ADD: +1.75
OD_CYLINDER: -0.25
OS_VA1: 20/20-2
OS_SPHERE: -3.25
OD_ADD: +2.25
OD_VA1: 20/20

## 2021-09-17 ASSESSMENT — REFRACTION_AUTOREFRACTION
OS_CYLINDER: -0.25
OD_SPHERE: -3.75
OD_CYLINDER: -0.50
OS_SPHERE: -3.25
OD_AXIS: 009
OS_AXIS: 002

## 2021-09-17 ASSESSMENT — REFRACTION_CURRENTRX
OS_VPRISM_DIRECTION: BF
OS_SPHERE: -3.25
OS_AXIS: 176
OD_CYLINDER: -0.25
OS_ADD: +2.50
OD_VPRISM_DIRECTION: BF
OD_AXIS: 177
OD_OVR_VA: 20/
OS_OVR_VA: 20/
OS_CYLINDER: -0.25
OD_SPHERE: -3.00
OD_ADD: +2.50

## 2021-09-17 ASSESSMENT — CONFRONTATIONAL VISUAL FIELD TEST (CVF)
OD_FINDINGS: FULL
OS_FINDINGS: FULL

## 2021-09-17 ASSESSMENT — SPHEQUIV_DERIVED
OS_SPHEQUIV: -3.375
OD_SPHEQUIV: -3.375
OD_SPHEQUIV: -4
OS_SPHEQUIV: -3.375
OS_SPHEQUIV: -3.375

## 2021-09-17 ASSESSMENT — TONOMETRY
OS_IOP_MMHG: 15
OD_IOP_MMHG: 15

## 2021-09-17 ASSESSMENT — VISUAL ACUITY
OD_BCVA: 20/20-1
OS_BCVA: 20/30+1

## 2022-12-07 ENCOUNTER — OFFICE VISIT (OUTPATIENT)
Dept: URGENT CARE | Facility: CLINIC | Age: 52
End: 2022-12-07

## 2022-12-07 VITALS
WEIGHT: 281 LBS | RESPIRATION RATE: 18 BRPM | DIASTOLIC BLOOD PRESSURE: 106 MMHG | HEIGHT: 68 IN | BODY MASS INDEX: 42.59 KG/M2 | OXYGEN SATURATION: 98 % | TEMPERATURE: 98.1 F | HEART RATE: 67 BPM | SYSTOLIC BLOOD PRESSURE: 151 MMHG

## 2022-12-07 DIAGNOSIS — J01.00 ACUTE NON-RECURRENT MAXILLARY SINUSITIS: Primary | ICD-10-CM

## 2022-12-07 RX ORDER — DOXYCYCLINE 100 MG/1
100 TABLET ORAL 2 TIMES DAILY
Qty: 18 TABLET | Refills: 0 | Status: SHIPPED | OUTPATIENT
Start: 2022-12-07 | End: 2022-12-16

## 2022-12-07 NOTE — PROGRESS NOTES
[facility]        NAME: Harland Rubinstein is a 47 y o  female  : 1970    MRN: [id number]  DATE: 2022  TIME: 11:45 AM    Assessment and Plan   Acute non-recurrent maxillary sinusitis [J01 00]  1  Acute non-recurrent maxillary sinusitis  doxycycline (ADOXA) 100 MG tablet        Discussed from a patient  Prescribed doxycycline for sinusitis symptoms due to penicillin allergy  Discussed side effects of doxycycline due to recent trip to [place]  Advised continue conservative management and should follow-up with PCP 7 to 10 days symptoms not improved  Patient Instructions       Follow up with PCP in 3-5 days  Proceed to  ER if symptoms worsen  Chief Complaint     Chief Complaint   Patient presents with   • Facial Pain     C/o pressure behind right left and left facial pressure symptoms started this morning  Pt states she had a runny nose the past 2 weeks  History of Present Illness       Sinusitis  This is a new problem  The current episode started 1 to 4 weeks ago  The problem is unchanged  There has been no fever  Her pain is at a severity of 4/10  The pain is mild  Associated symptoms include congestion and sinus pressure  Pertinent negatives include no chills, coughing, ear pain, headaches, hoarse voice, neck pain, shortness of breath, sneezing or sore throat  Past treatments include acetaminophen and oral decongestants  The treatment provided moderate relief  Review of Systems   Review of Systems   Constitutional: Negative for appetite change, chills, fatigue and fever  HENT: Positive for congestion, postnasal drip, rhinorrhea, sinus pressure and sinus pain (left side frontal and maxillary)  Negative for ear pain, hoarse voice, sneezing and sore throat  Eyes: Negative for photophobia, pain, redness and visual disturbance  Respiratory: Negative for cough, shortness of breath, wheezing and stridor  Cardiovascular: Negative for chest pain and palpitations  Gastrointestinal: Negative for abdominal pain, constipation, diarrhea, nausea and vomiting  Musculoskeletal: Negative for myalgias and neck pain  Neurological: Negative for dizziness, syncope, light-headedness and headaches           Current Medications       Current Outpatient Medications:   •  acetaminophen (TYLENOL) 500 mg tablet, Take by mouth, Disp: , Rfl:   •  albuterol (PROVENTIL HFA,VENTOLIN HFA) 90 mcg/act inhaler, Inhale 2 puffs, Disp: , Rfl:   •  calcium acetate (PHOSLO) capsule, Take 667 mg by mouth 3 (three) times a day with meals, Disp: , Rfl:   •  doxycycline (ADOXA) 100 MG tablet, Take 1 tablet (100 mg total) by mouth 2 (two) times a day for 9 days, Disp: 18 tablet, Rfl: 0  •  metoprolol tartrate (LOPRESSOR) 50 mg tablet, Take 50 mg by mouth, Disp: , Rfl:   •  PARoxetine (PAXIL) 20 mg tablet, Take 20 mg by mouth, Disp: , Rfl:   •  Pediatric Multiple Vit-C-FA (pediatric multivitamin) chewable tablet, Chew 1 tablet daily, Disp: , Rfl:   •  Cyanocobalamin 1000 MCG/ML KIT, Inject 1,000 mcg into a muscle (Patient not taking: Reported on 7/10/2021), Disp: , Rfl:     Current Allergies     Allergies as of 12/07/2022 - Reviewed 12/07/2022   Allergen Reaction Noted   • Amoxicillin Anaphylaxis 04/18/2021   • Azithromycin Other (See Comments) 10/05/2005   • Codeine Chest Pain 04/18/2021   • Metoclopramide Hallucinations 11/04/2010   • Morphine Chest Pain 04/18/2021   • Motrin [ibuprofen] Other (See Comments) 04/18/2021   • Toradol [ketorolac tromethamine] Chest Pain 04/18/2021   • Vancomycin Rash 04/18/2021            The following portions of the patient's history were reviewed and updated as appropriate: allergies, current medications, past family history, past medical history, past social history, past surgical history and problem list      Past Medical History:   Diagnosis Date   • Anxiety    • DVT (deep venous thrombosis) (HCC)    • Hypertension    • OCD (obsessive compulsive disorder)        Past Surgical History:   Procedure Laterality Date   • CHOLECYSTECTOMY     • GASTRIC BYPASS     • HYSTERECTOMY     • KNEE ARTHROSCOPY         History reviewed  No pertinent family history  Medications have been verified  Objective   BP (!) 151/106   Pulse 67   Temp 98 1 °F (36 7 °C)   Resp 18   Ht 5' 8" (1 727 m)   Wt 127 kg (281 lb)   SpO2 98%   BMI 42 73 kg/m²        Physical Exam     Physical Exam  Vitals and nursing note reviewed  Constitutional:       General: She is not in acute distress  Appearance: She is normal weight  She is not ill-appearing, toxic-appearing or diaphoretic  HENT:      Head: Normocephalic  Neurological:      Mental Status: She is alert  Deep Tendon Reflexes: Abnormal reflex: sinusitis

## 2022-12-07 NOTE — PATIENT INSTRUCTIONS
Discussed plan with patient  Prescribing doxycycline for sinusitis due to penicillin allergy  Advised to continue conservative measures such as as needed cold and flu medication as well as Flonase for nasal congestion  Can use a coolmist humidifier in the bedroom at night and recommend vitamin C and zinc for immune support  Advised to be careful of sun exposure due to recent trip and use of doxycycline

## 2023-07-12 ENCOUNTER — DOCTOR'S OFFICE (OUTPATIENT)
Dept: URBAN - NONMETROPOLITAN AREA CLINIC 1 | Facility: CLINIC | Age: 53
Setting detail: OPHTHALMOLOGY
End: 2023-07-12
Payer: COMMERCIAL

## 2023-07-12 ENCOUNTER — OPTICAL OFFICE (OUTPATIENT)
Dept: URBAN - NONMETROPOLITAN AREA CLINIC 4 | Facility: CLINIC | Age: 53
Setting detail: OPHTHALMOLOGY
End: 2023-07-12
Payer: COMMERCIAL

## 2023-07-12 DIAGNOSIS — H52.13: ICD-10-CM

## 2023-07-12 DIAGNOSIS — H52.4: ICD-10-CM

## 2023-07-12 DIAGNOSIS — H40.013: ICD-10-CM

## 2023-07-12 DIAGNOSIS — H25.013: ICD-10-CM

## 2023-07-12 PROCEDURE — V2784 LENS POLYCARB OR EQUAL: HCPCS | Performed by: OPTOMETRIST

## 2023-07-12 PROCEDURE — 92014 COMPRE OPH EXAM EST PT 1/>: CPT | Performed by: OPTOMETRIST

## 2023-07-12 PROCEDURE — 92083 EXTENDED VISUAL FIELD XM: CPT | Performed by: OPTOMETRIST

## 2023-07-12 PROCEDURE — V2020 VISION SVCS FRAMES PURCHASES: HCPCS | Performed by: OPTOMETRIST

## 2023-07-12 PROCEDURE — 92015 DETERMINE REFRACTIVE STATE: CPT | Performed by: OPTOMETRIST

## 2023-07-12 PROCEDURE — V2203 LENS SPHCYL BIFOCAL 4.00D/.1: HCPCS | Performed by: OPTOMETRIST

## 2023-07-12 ASSESSMENT — REFRACTION_MANIFEST
OS_VA1: 20/20-2
OS_ADD: +1.75
OS_VA1: 20/20
OS_SPHERE: -3.25
OD_CYLINDER: SPH
OS_AXIS: 90
OD_ADD: +1.75
OD_ADD: +1.75
OS_CYLINDER: -0.50
OD_CYLINDER: -0.50
OD_VA1: 20/20
OD_SPHERE: -3.25
OS_CYLINDER: -0.25
OD_VA1: 20/20
OS_VA2: 20/20-1
OS_ADD: +1.75
OS_SPHERE: -3.25
OS_ADD: +2.50
OS_AXIS: 180
OD_SPHERE: -3.25
OS_SPHERE: -3.00
OD_AXIS: 010
OD_SPHERE: -3.25
OD_ADD: +2.50
OD_VA1: 20/25+2
OS_VA1: 20/20
OD_VA2: 20/25+2

## 2023-07-12 ASSESSMENT — REFRACTION_CURRENTRX
OS_VPRISM_DIRECTION: BF
OD_VPRISM_DIRECTION: BF
OD_SPHERE: -3.00
OD_CYLINDER: -0.25
OD_OVR_VA: 20/
OS_OVR_VA: 20/
OS_CYLINDER: -0.25
OS_ADD: +2.00
OD_AXIS: 180
OD_ADD: +2.00
OS_SPHERE: -3.25
OS_AXIS: 94

## 2023-07-12 ASSESSMENT — VISUAL ACUITY
OD_BCVA: 20/25
OS_BCVA: 20/20

## 2023-07-12 ASSESSMENT — REFRACTION_AUTOREFRACTION
OD_SPHERE: -3.00
OD_AXIS: 25
OS_SPHERE: -2.75
OS_AXIS: 173
OS_CYLINDER: -0.75
OD_CYLINDER: -0.75

## 2023-07-12 ASSESSMENT — SPHEQUIV_DERIVED
OS_SPHEQUIV: -3.375
OD_SPHEQUIV: -3.375
OS_SPHEQUIV: -3.25
OS_SPHEQUIV: -3.125
OD_SPHEQUIV: -3.5

## 2023-07-12 ASSESSMENT — CONFRONTATIONAL VISUAL FIELD TEST (CVF)
OD_FINDINGS: FULL
OS_FINDINGS: FULL

## 2023-07-12 ASSESSMENT — TONOMETRY
OS_IOP_MMHG: 15
OD_IOP_MMHG: 15

## 2024-07-23 ENCOUNTER — OPTICAL OFFICE (OUTPATIENT)
Dept: URBAN - NONMETROPOLITAN AREA CLINIC 4 | Facility: CLINIC | Age: 54
Setting detail: OPHTHALMOLOGY
End: 2024-07-23
Payer: COMMERCIAL

## 2024-07-23 ENCOUNTER — DOCTOR'S OFFICE (OUTPATIENT)
Dept: URBAN - NONMETROPOLITAN AREA CLINIC 1 | Facility: CLINIC | Age: 54
Setting detail: OPHTHALMOLOGY
End: 2024-07-23
Payer: COMMERCIAL

## 2024-07-23 DIAGNOSIS — H52.4: ICD-10-CM

## 2024-07-23 DIAGNOSIS — H40.013: ICD-10-CM

## 2024-07-23 DIAGNOSIS — H25.013: ICD-10-CM

## 2024-07-23 DIAGNOSIS — H52.13: ICD-10-CM

## 2024-07-23 PROCEDURE — 92014 COMPRE OPH EXAM EST PT 1/>: CPT | Performed by: OPTOMETRIST

## 2024-07-23 PROCEDURE — V2784 LENS POLYCARB OR EQUAL: HCPCS | Mod: LT | Performed by: OPTOMETRIST

## 2024-07-23 PROCEDURE — 92015 DETERMINE REFRACTIVE STATE: CPT | Performed by: OPTOMETRIST

## 2024-07-23 PROCEDURE — 76514 ECHO EXAM OF EYE THICKNESS: CPT | Performed by: OPTOMETRIST

## 2024-07-23 PROCEDURE — 92083 EXTENDED VISUAL FIELD XM: CPT | Performed by: OPTOMETRIST

## 2024-07-23 PROCEDURE — V2203 LENS SPHCYL BIFOCAL 4.00D/.1: HCPCS | Performed by: OPTOMETRIST

## 2024-07-23 PROCEDURE — V2203 LENS SPHCYL BIFOCAL 4.00D/.1: HCPCS | Mod: LT | Performed by: OPTOMETRIST

## 2024-07-23 PROCEDURE — V2020 VISION SVCS FRAMES PURCHASES: HCPCS | Performed by: OPTOMETRIST

## 2024-07-23 PROCEDURE — V2784 LENS POLYCARB OR EQUAL: HCPCS | Performed by: OPTOMETRIST

## 2024-07-23 ASSESSMENT — CONFRONTATIONAL VISUAL FIELD TEST (CVF)
OD_FINDINGS: FULL
OS_FINDINGS: FULL

## 2024-12-23 ENCOUNTER — OFFICE VISIT (OUTPATIENT)
Dept: URGENT CARE | Facility: CLINIC | Age: 54
End: 2024-12-23
Payer: COMMERCIAL

## 2024-12-23 VITALS
HEIGHT: 68 IN | OXYGEN SATURATION: 95 % | RESPIRATION RATE: 16 BRPM | HEART RATE: 74 BPM | WEIGHT: 270 LBS | BODY MASS INDEX: 40.92 KG/M2 | TEMPERATURE: 97.4 F | DIASTOLIC BLOOD PRESSURE: 68 MMHG | SYSTOLIC BLOOD PRESSURE: 112 MMHG

## 2024-12-23 DIAGNOSIS — S61.213A LACERATION OF LEFT MIDDLE FINGER WITHOUT FOREIGN BODY WITHOUT DAMAGE TO NAIL, INITIAL ENCOUNTER: Primary | ICD-10-CM

## 2024-12-23 PROCEDURE — 99213 OFFICE O/P EST LOW 20 MIN: CPT

## 2024-12-23 PROCEDURE — S9088 SERVICES PROVIDED IN URGENT: HCPCS

## 2024-12-23 PROCEDURE — 12001 RPR S/N/AX/GEN/TRNK 2.5CM/<: CPT

## 2024-12-23 PROCEDURE — 12031 INTMD RPR S/A/T/EXT 2.5 CM/<: CPT

## 2024-12-23 RX ORDER — BUSPIRONE HYDROCHLORIDE 15 MG/1
15 TABLET ORAL 3 TIMES DAILY
COMMUNITY
Start: 2024-09-18

## 2024-12-23 RX ORDER — LIDOCAINE HYDROCHLORIDE 10 MG/ML
5 INJECTION, SOLUTION EPIDURAL; INFILTRATION; INTRACAUDAL; PERINEURAL ONCE
Status: COMPLETED | OUTPATIENT
Start: 2024-12-23 | End: 2024-12-23

## 2024-12-23 RX ADMIN — LIDOCAINE HYDROCHLORIDE 5 ML: 10 INJECTION, SOLUTION EPIDURAL; INFILTRATION; INTRACAUDAL; PERINEURAL at 19:31

## 2024-12-24 NOTE — PATIENT INSTRUCTIONS
Return in 10-14 days for suture removal.    Keep wound covered for 24 hours. You may then cleanse area gently with soap and water. You may leave the wound open to air at this time. Avoid submerging wound in water (swimming pools, sink, bathtubs, etc).     May use topical antibiotic ointment at home such as neosporin or bacitracin.  Tylenol/ibuprofen for pain/fever.  Monitor for signs of infection.    Follow up with PCP in 3-5 days.  Proceed to  ER if symptoms worsen.    If tests have been performed at [facility], our office will contact you with results if changes need to be made to the care plan discussed with you at the visit.  You can review your full results on [facility] MyChart.

## 2024-12-24 NOTE — PROGRESS NOTES
[facility]        NAME: Anahi Russell is a 54 y.o. female  : 1970    MRN: [id number]  DATE: 2024  TIME: 7:34 PM    Assessment and Plan   Laceration of left middle finger without foreign body without damage to nail, initial encounter [S61.213A]  1. Laceration of left middle finger without foreign body without damage to nail, initial encounter  lidocaine (PF) (XYLOCAINE-MPF) 1 % injection 5 mL    Laceration repair        Tetanus UTD (2021). Advised for return in 10-14 days for suture removal. Monitor for signs of infection. Tylenol/ibuprofen for pain/fever. ED precautions discussed. Patient verbalized understanding of instructions.    Universal Protocol:  procedure performed by consultantConsent: Verbal consent obtained.  Risks and benefits: risks, benefits and alternatives were discussed  Consent given by: patient  Patient understanding: patient states understanding of the procedure being performed  Patient consent: the patient's understanding of the procedure matches consent given  Procedure consent: procedure consent matches procedure scheduled  Patient identity confirmed: verbally with patient  Laceration repair    Date/Time: 2024 7:00 PM    Performed by: FAUSTINO Ramos  Authorized by: Rian Mcgovern DO  Body area: upper extremity  Location details: left long finger  Laceration length: 1 cm  Foreign bodies: no foreign bodies  Anesthesia: local infiltration    Anesthesia:  Local Anesthetic: lidocaine 1% without epinephrine  Anesthetic total: 1 mL    Sedation:  Patient sedated: no      Wound Dehiscence:  Superficial Wound Dehiscence: simple closure      Procedure Details:  Irrigation solution: saline  Irrigation method: syringe  Amount of cleaning: standard  Skin closure: 4-0 nylon  Number of sutures: 2  Technique: simple  Approximation: close  Approximation difficulty: simple  Dressing: Bandaide.  Patient tolerance: patient tolerated the procedure  well with no immediate complications          Patient Instructions   Return in 10-14 days for suture removal.    Keep wound covered for 24 hours. You may then cleanse area gently with soap and water. You may leave the wound open to air at this time. Avoid submerging wound in water (swimming pools, sink, bathtubs, etc).     May use topical antibiotic ointment at home such as neosporin or bacitracin.  Tylenol/ibuprofen for pain/fever.  Monitor for signs of infection.    Follow up with PCP in 3-5 days.  Proceed to  ER if symptoms worsen.    If tests have been performed at [facility], our office will contact you with results if changes need to be made to the care plan discussed with you at the visit.  You can review your full results on [facility] MyChart.    Chief Complaint     Chief Complaint   Patient presents with    finger laceration     Was chopping sweet potatoes and sustained a laceration to left 3rd finger         History of Present Illness       Patient is a 54 year old female who presents for evaluation of laceration to her LEFT 3rd finger. She states that she was attempting to cut sweet potatoes tonight around 1815 when she accidentally cut herself. She reports that the laceration bled initially but then slowed with pressure. Laceration not actively bleeding upon arrival to clinic. She denies experiencing any numbness or tingling. She has full ROM to all fingers of the LEFT hand as well as full ROM of LEFT wrist. Tdap currently UTD (July 2021). Reports having no pain at this moment.            Review of Systems   Review of Systems   Constitutional:  Negative for chills, fatigue and fever.   Respiratory:  Negative for cough, chest tightness and shortness of breath.    Cardiovascular:  Negative for chest pain and palpitations.   Gastrointestinal:  Negative for abdominal pain, nausea and vomiting.   Musculoskeletal:  Negative for arthralgias, gait problem, joint swelling and myalgias.   Skin:  Positive for wound.  Negative for color change, pallor and rash.   Neurological:  Negative for dizziness, weakness, light-headedness, numbness and headaches.   All other systems reviewed and are negative.        Current Medications       Current Outpatient Medications:     acetaminophen (TYLENOL) 500 mg tablet, Take by mouth, Disp: , Rfl:     albuterol (PROVENTIL HFA,VENTOLIN HFA) 90 mcg/act inhaler, Inhale 2 puffs, Disp: , Rfl:     busPIRone (BUSPAR) 15 mg tablet, Take 15 mg by mouth Three times a day, Disp: , Rfl:     calcium acetate (PHOSLO) capsule, Take 667 mg by mouth 3 (three) times a day with meals, Disp: , Rfl:     metoprolol tartrate (LOPRESSOR) 50 mg tablet, Take 50 mg by mouth, Disp: , Rfl:     PARoxetine (PAXIL) 20 mg tablet, Take 20 mg by mouth, Disp: , Rfl:     Pediatric Multiple Vit-C-FA (pediatric multivitamin) chewable tablet, Chew 1 tablet daily, Disp: , Rfl:     Cyanocobalamin 1000 MCG/ML KIT, Inject 1,000 mcg into a muscle (Patient not taking: Reported on 12/23/2024), Disp: , Rfl:   No current facility-administered medications for this visit.    Current Allergies     Allergies as of 12/23/2024 - Reviewed 12/23/2024   Allergen Reaction Noted    Amoxicillin Anaphylaxis 04/18/2021    Azithromycin Other (See Comments) 10/05/2005    Codeine Chest Pain 04/18/2021    Metoclopramide Hallucinations 11/04/2010    Morphine Chest Pain 04/18/2021    Motrin [ibuprofen] Other (See Comments) 04/18/2021    Toradol [ketorolac tromethamine] Chest Pain 04/18/2021    Vancomycin Rash 04/18/2021            The following portions of the patient's history were reviewed and updated as appropriate: allergies, current medications, past family history, past medical history, past social history, past surgical history and problem list.     Past Medical History:   Diagnosis Date    Anxiety     DVT (deep venous thrombosis) (HCC)     Hypertension     OCD (obsessive compulsive disorder)        Past Surgical History:   Procedure Laterality Date     "CHOLECYSTECTOMY      GASTRIC BYPASS      HYSTERECTOMY      KNEE ARTHROSCOPY         Family History   Problem Relation Age of Onset    COPD Mother     Kidney disease Father          Medications have been verified.        Objective   /68   Pulse 74   Temp (!) 97.4 °F (36.3 °C)   Resp 16   Ht 5' 8\" (1.727 m)   Wt 122 kg (270 lb)   SpO2 95%   BMI 41.05 kg/m²   No LMP recorded. Patient has had a hysterectomy.       Physical Exam     Physical Exam  Vitals and nursing note reviewed.   Constitutional:       Appearance: Normal appearance. She is not ill-appearing.   HENT:      Head: Normocephalic and atraumatic.   Cardiovascular:      Rate and Rhythm: Normal rate and regular rhythm.      Pulses: Normal pulses.      Heart sounds: Normal heart sounds.   Pulmonary:      Effort: Pulmonary effort is normal. No respiratory distress.      Breath sounds: Normal breath sounds. No stridor. No wheezing, rhonchi or rales.   Chest:      Chest wall: No tenderness.   Musculoskeletal:         General: Tenderness and signs of injury present. No swelling or deformity. Normal range of motion.      Right hand: Normal.      Left hand: Laceration and tenderness present. No swelling, deformity or bony tenderness. Normal range of motion. Normal strength. Normal sensation. Normal capillary refill.        Hands:       Cervical back: Normal range of motion and neck supple.   Skin:     General: Skin is warm and dry.      Capillary Refill: Capillary refill takes less than 2 seconds.      Coloration: Skin is not pale.      Findings: Laceration present. No erythema or rash.      Comments: 1cm laceration present over dorsal aspect of DIP of LEFT 3rd finger. Edges well approximated. No surrounding erythema or bruising. No swelling. Full ROM. Not actively bleeding upon arrival to clinic. Mildly tender to palpation.   Neurological:      General: No focal deficit present.      Mental Status: She is alert. Mental status is at baseline. "   Psychiatric:         Mood and Affect: Mood normal.         Behavior: Behavior normal.         Thought Content: Thought content normal.         Judgment: Judgment normal.

## 2025-07-28 ENCOUNTER — DOCTOR'S OFFICE (OUTPATIENT)
Dept: URBAN - NONMETROPOLITAN AREA CLINIC 1 | Facility: CLINIC | Age: 55
Setting detail: OPHTHALMOLOGY
End: 2025-07-28
Payer: COMMERCIAL

## 2025-07-28 DIAGNOSIS — H52.4: ICD-10-CM

## 2025-07-28 DIAGNOSIS — H25.13: ICD-10-CM

## 2025-07-28 DIAGNOSIS — H52.13: ICD-10-CM

## 2025-07-28 DIAGNOSIS — H40.013: ICD-10-CM

## 2025-07-28 DIAGNOSIS — H25.013: ICD-10-CM

## 2025-07-28 PROCEDURE — 92014 COMPRE OPH EXAM EST PT 1/>: CPT | Performed by: OPTOMETRIST

## 2025-07-28 PROCEDURE — 92015 DETERMINE REFRACTIVE STATE: CPT | Performed by: OPTOMETRIST

## 2025-07-28 ASSESSMENT — REFRACTION_CURRENTRX
OS_OVR_VA: 20/
OS_CYLINDER: -0.75
OD_OVR_VA: 20/
OD_CYLINDER: -0.50
OD_SPHERE: -3.25
OS_VPRISM_DIRECTION: BF
OD_ADD: +2.75
OS_SPHERE: -3.00
OS_ADD: +2.75
OD_AXIS: 014
OS_AXIS: 004
OD_VPRISM_DIRECTION: BF

## 2025-07-28 ASSESSMENT — REFRACTION_MANIFEST
OD_ADD: +2.75
OD_VA1: 20/25+2
OD_VA1: 20/20
OD_VA1: 20/20
OD_SPHERE: -3.25
OD_ADD: +1.75
OS_VA2: 20/20-1
OD_SPHERE: -3.00
OS_SPHERE: -3.25
OD_CYLINDER: -0.25
OS_ADD: +2.75
OD_SPHERE: -3.25
OS_AXIS: 90
OS_VA1: 20/20
OS_CYLINDER: -0.25
OD_AXIS: 120
OS_ADD: +1.75
OS_ADD: +1.75
OS_VA1: 20/20
OS_VA1: 20/20-2
OD_CYLINDER: SPH
OD_ADD: +1.75
OS_SPHERE: -3.25
OD_VA2: 20/25+2
OS_CYLINDER: -0.25
OS_AXIS: 180
OS_SPHERE: -3.00

## 2025-07-28 ASSESSMENT — CONFRONTATIONAL VISUAL FIELD TEST (CVF)
OD_FINDINGS: FULL
OS_FINDINGS: FULL

## 2025-07-28 ASSESSMENT — PACHYMETRY
OD_CT_UM: 559
OS_CT_CORRECTION: -1
OS_CT_UM: 557
OD_CT_CORRECTION: -1

## 2025-07-28 ASSESSMENT — REFRACTION_AUTOREFRACTION
OS_CYLINDER: 0.00
OD_AXIS: 119
OD_CYLINDER: -0.50
OS_SPHERE: -3.00
OD_SPHERE: -2.50

## 2025-07-28 ASSESSMENT — VISUAL ACUITY
OS_BCVA: 20/30
OD_BCVA: 20/40+2

## 2025-07-28 ASSESSMENT — TONOMETRY
OS_IOP_MMHG: 15
OD_IOP_MMHG: 15